# Patient Record
Sex: MALE | Race: WHITE | Employment: OTHER | URBAN - NONMETROPOLITAN AREA
[De-identification: names, ages, dates, MRNs, and addresses within clinical notes are randomized per-mention and may not be internally consistent; named-entity substitution may affect disease eponyms.]

---

## 2017-09-18 LAB — PROSTATE SPECIFIC ANTIGEN: NORMAL NG/ML

## 2017-09-28 ENCOUNTER — OFFICE VISIT (OUTPATIENT)
Dept: UROLOGY | Age: 67
End: 2017-09-28
Payer: MEDICARE

## 2017-09-28 VITALS
BODY MASS INDEX: 29.41 KG/M2 | SYSTOLIC BLOOD PRESSURE: 138 MMHG | WEIGHT: 183 LBS | DIASTOLIC BLOOD PRESSURE: 80 MMHG | HEIGHT: 66 IN

## 2017-09-28 DIAGNOSIS — N52.32 ERECTILE DYSFUNCTION FOLLOWING RADICAL CYSTECTOMY: Primary | ICD-10-CM

## 2017-09-28 DIAGNOSIS — C61 PROSTATE CANCER (HCC): ICD-10-CM

## 2017-09-28 LAB
BILIRUBIN, POC: NORMAL
BLOOD URINE, POC: NORMAL
CLARITY, POC: NORMAL
COLOR, POC: NORMAL
GLUCOSE URINE, POC: NORMAL
KETONES, POC: NORMAL
LEUKOCYTE EST, POC: NORMAL
NITRITE, POC: NORMAL
PH, POC: 6
PROTEIN, POC: NORMAL
SPECIFIC GRAVITY, POC: 1.01
UROBILINOGEN, POC: 0.2

## 2017-09-28 PROCEDURE — 81003 URINALYSIS AUTO W/O SCOPE: CPT | Performed by: NURSE PRACTITIONER

## 2017-09-28 PROCEDURE — 99213 OFFICE O/P EST LOW 20 MIN: CPT | Performed by: NURSE PRACTITIONER

## 2017-09-28 ASSESSMENT — ENCOUNTER SYMPTOMS
ABDOMINAL DISTENTION: 0
NAUSEA: 0
ABDOMINAL PAIN: 0
VOMITING: 0

## 2018-09-04 LAB — PROSTATE SPECIFIC ANTIGEN: NORMAL NG/ML

## 2018-09-27 ENCOUNTER — OFFICE VISIT (OUTPATIENT)
Dept: UROLOGY | Age: 68
End: 2018-09-27
Payer: MEDICARE

## 2018-09-27 VITALS
DIASTOLIC BLOOD PRESSURE: 78 MMHG | BODY MASS INDEX: 28.93 KG/M2 | SYSTOLIC BLOOD PRESSURE: 136 MMHG | HEIGHT: 66 IN | WEIGHT: 180 LBS

## 2018-09-27 DIAGNOSIS — C61 PROSTATE CA (HCC): Primary | ICD-10-CM

## 2018-09-27 LAB
BILIRUBIN, POC: NORMAL
BLOOD URINE, POC: NORMAL
CLARITY, POC: CLEAR
COLOR, POC: YELLOW
GLUCOSE URINE, POC: NORMAL
KETONES, POC: NORMAL
LEUKOCYTE EST, POC: NORMAL
NITRITE, POC: NORMAL
PH, POC: 6
PROTEIN, POC: NORMAL
SPECIFIC GRAVITY, POC: 1.02
UROBILINOGEN, POC: 0.2

## 2018-09-27 PROCEDURE — 99213 OFFICE O/P EST LOW 20 MIN: CPT | Performed by: NURSE PRACTITIONER

## 2018-09-27 PROCEDURE — 81003 URINALYSIS AUTO W/O SCOPE: CPT | Performed by: NURSE PRACTITIONER

## 2018-09-27 ASSESSMENT — ENCOUNTER SYMPTOMS
NAUSEA: 0
ABDOMINAL DISTENTION: 0
VOMITING: 0
ABDOMINAL PAIN: 0

## 2018-09-27 NOTE — PROGRESS NOTES
Urobilinogen, UA 0.2     Leukocytes, UA neg     Nitrite, UA neg          Assessment:      Prostate Cancer pT2C N0 M0      Plan:      PSA remains undetectable showing great cancer control. He does not want to try anything further for ED. Follow-up in 1 year with psa prior.

## 2019-09-16 LAB — PROSTATE SPECIFIC ANTIGEN: NORMAL NG/ML

## 2019-10-09 ENCOUNTER — OFFICE VISIT (OUTPATIENT)
Dept: UROLOGY | Age: 69
End: 2019-10-09
Payer: MEDICARE

## 2019-10-09 VITALS
DIASTOLIC BLOOD PRESSURE: 70 MMHG | SYSTOLIC BLOOD PRESSURE: 136 MMHG | BODY MASS INDEX: 28.45 KG/M2 | HEIGHT: 66 IN | WEIGHT: 177 LBS

## 2019-10-09 DIAGNOSIS — C61 PROSTATE CANCER (HCC): Primary | ICD-10-CM

## 2019-10-09 LAB
BILIRUBIN, POC: NORMAL
BLOOD URINE, POC: NORMAL
CLARITY, POC: CLEAR
COLOR, POC: YELLOW
GLUCOSE URINE, POC: NORMAL
KETONES, POC: NORMAL
LEUKOCYTE EST, POC: NORMAL
NITRITE, POC: NORMAL
PH, POC: 6.5
PROTEIN, POC: NORMAL
SPECIFIC GRAVITY, POC: <=1.005
UROBILINOGEN, POC: 0.2

## 2019-10-09 PROCEDURE — 99213 OFFICE O/P EST LOW 20 MIN: CPT | Performed by: NURSE PRACTITIONER

## 2019-10-09 PROCEDURE — 81003 URINALYSIS AUTO W/O SCOPE: CPT | Performed by: NURSE PRACTITIONER

## 2019-10-09 ASSESSMENT — ENCOUNTER SYMPTOMS
ABDOMINAL PAIN: 0
BACK PAIN: 0

## 2020-09-21 LAB — PROSTATE SPECIFIC ANTIGEN: NORMAL

## 2020-10-07 ENCOUNTER — OFFICE VISIT (OUTPATIENT)
Dept: UROLOGY | Age: 70
End: 2020-10-07
Payer: MEDICARE

## 2020-10-07 VITALS — TEMPERATURE: 97.8 F | HEIGHT: 66 IN | BODY MASS INDEX: 27.58 KG/M2 | WEIGHT: 171.6 LBS

## 2020-10-07 LAB
BILIRUBIN, POC: NORMAL
BLOOD URINE, POC: NEGATIVE
CLARITY, POC: CLEAR
COLOR, POC: YELLOW
GLUCOSE URINE, POC: NEGATIVE
KETONES, POC: NORMAL
LEUKOCYTE EST, POC: NEGATIVE
NITRITE, POC: NEGATIVE
PH, POC: 7
PROTEIN, POC: NORMAL
SPECIFIC GRAVITY, POC: 1.01
UROBILINOGEN, POC: 0.2

## 2020-10-07 PROCEDURE — 99213 OFFICE O/P EST LOW 20 MIN: CPT | Performed by: NURSE PRACTITIONER

## 2020-10-07 PROCEDURE — 81003 URINALYSIS AUTO W/O SCOPE: CPT | Performed by: NURSE PRACTITIONER

## 2020-10-07 RX ORDER — QUINAPRIL HCL AND HYDROCHLOROTHIAZIDE 20; 12.5 MG/1; MG/1
TABLET ORAL
COMMUNITY
Start: 2020-09-10 | End: 2022-10-19

## 2020-10-07 ASSESSMENT — ENCOUNTER SYMPTOMS
BACK PAIN: 0
ABDOMINAL PAIN: 0

## 2020-10-07 NOTE — PROGRESS NOTES
[address] [facility]  [address] [facility] [address]  Dept: [phone]  Loc: [phone]    Visit Date: 10/7/2020        HPI:     Verito Hyatt is a 65 y.o. male who presents today for:  Chief Complaint   Patient presents with    Prostate Cancer    1 Year Follow Up     PSA prior       HPI   Pt seen in follow up for prostate cancer. Pt underwent RALRP with BLND 8/27/12 by Dr Genny Higgins with [place] 3+4=7 [id number] prostate cancer. Pt doing well. Denies any issues with incontinence or urination. No hematuria, dysuria. No abdominal/back/flank pain. Denies erectile dysfunction. Current Outpatient Medications   Medication Sig Dispense Refill    quinapril-hydroCHLOROthiazide (ACCURETIC) 20-12.5 MG per tablet       aspirin 325 MG EC tablet Take 325 mg by mouth daily.  omeprazole (PRILOSEC) 20 MG capsule Take 20 mg by mouth daily. No current facility-administered medications for this visit. Past Medical History  Yuan Downey  has a past medical history of Hypertension and Prostate CA ([facility] [address] Past Surgical History  The patient  has a past surgical history that includes Tonsillectomy and adenoidectomy (1760); hernia repair (1982); Prostatectomy (8-3-12); colectomy (9-1-2012); Vasectomy (); and Colonoscopy (9-9-2013). Family History  This patient's family history includes Asthma in his maternal grandmother; Cancer in his mother; Heart Disease in his father, maternal grandfather, maternal grandmother, and mother; High Blood Pressure in his father and mother; High Cholesterol in his father; [place] / [place] in his mother; Stroke in his mother. Social History  Yuan Downey  reports that he quit smoking about 30 years ago. His smoking use included cigarettes. He has a 0.50 pack-year smoking history. He has never used smokeless tobacco. He reports current alcohol use of about 3.0 standard drinks of alcohol per week. He reports that he does not use drugs. Subjective:      Review of Systems   Constitutional: Negative for activity change, appetite change, chills, diaphoresis, fatigue, fever and unexpected weight change. Gastrointestinal: Negative for abdominal pain. Genitourinary: Negative for decreased urine volume, difficulty urinating, dysuria, flank pain, frequency, hematuria and urgency. Musculoskeletal: Negative for back pain. Objective:   Temp 97.8 °F (36.6 °C) (Temporal)   Ht 5' 6\" (1.676 m)   Wt 171 lb 9.6 oz (77.8 kg)   BMI 27.70 kg/m²     Physical Exam  Constitutional:       General: He is not in acute distress. Appearance: Normal appearance. He is not ill-appearing or diaphoretic. HENT:      Head: Normocephalic and atraumatic. Right Ear: External ear normal.      Left Ear: External ear normal.      Nose: Nose normal.      Mouth/Throat:      Mouth: Mucous membranes are moist.   Eyes:      General: No scleral icterus. Right eye: No discharge. Left eye: No discharge. Pulmonary:      Effort: Pulmonary effort is normal. No respiratory distress. Abdominal:      Tenderness: There is no right CVA tenderness or left CVA tenderness. Musculoskeletal: Normal range of motion. Skin:     General: Skin is warm and dry. Neurological:      Mental Status: He is alert and oriented to person, place, and time. Mental status is at baseline. Psychiatric:         Mood and Affect: Mood normal.         Behavior: Behavior normal.         Thought Content:  Thought content normal.         POC  Results for POC orders placed in visit on 10/07/20   POCT Urinalysis No Micro (Auto)   Result Value Ref Range    Color, UA Yellow     Clarity, UA Clear     Glucose, UA POC Negative     Bilirubin, UA Small     Ketones, UA Trace     Spec Grav, UA 1.015     Blood, UA POC Negative     pH, UA 7.0     Protein, UA POC Trace     Urobilinogen, UA 0.2     Leukocytes, UA Negative     Nitrite, UA Negative Patients recent PSA values are as follows  Lab Results   Component Value Date    PSA  09/21/2020      Comment:      <0.03    PSA  09/16/2019      Comment:      <0.03    PSA  09/04/2018      Comment:      <0.03        Recent BUN/Creatinine:  Lab Results   Component Value Date    BUN 7 08/04/2012    CREATININE 0.7 08/04/2012           Assessment:   Prostate cancer    Plan:     Pt's PSA continues at <.03 showing excellent prostate cancer control at this time. No issues with urination or erectile dysfunction. F/u in 1 year with PSA prior to appt.

## 2020-10-12 ENCOUNTER — NURSE ONLY (OUTPATIENT)
Dept: LAB | Age: 70
End: 2020-10-12

## 2021-09-23 LAB — PROSTATE SPECIFIC ANTIGEN: NORMAL

## 2021-10-14 ENCOUNTER — OFFICE VISIT (OUTPATIENT)
Dept: UROLOGY | Age: 71
End: 2021-10-14
Payer: MEDICARE

## 2021-10-14 VITALS
WEIGHT: 163 LBS | HEIGHT: 66 IN | BODY MASS INDEX: 26.2 KG/M2 | DIASTOLIC BLOOD PRESSURE: 78 MMHG | SYSTOLIC BLOOD PRESSURE: 128 MMHG

## 2021-10-14 DIAGNOSIS — C61 PROSTATE CANCER (HCC): Primary | ICD-10-CM

## 2021-10-14 PROCEDURE — 99213 OFFICE O/P EST LOW 20 MIN: CPT | Performed by: NURSE PRACTITIONER

## 2021-10-14 ASSESSMENT — ENCOUNTER SYMPTOMS
BACK PAIN: 0
NAUSEA: 0
ABDOMINAL PAIN: 0
VOMITING: 0

## 2021-10-14 NOTE — PROGRESS NOTES
[address] [address]  Dept: [phone]  Loc: [phone]    Visit Date: 10/14/2021        HPI:     Emma Peralta is a 66 y.o. male who presents today for:  Chief Complaint   Patient presents with    Follow-up     psa prior    Prostate Cancer       HPI   Pt seen in follow up for prostate cancer. Pt underwent RALRP with BLND 8/27/12 by Dr Maryjo Redd with [place] 3+4=7 [id number] prostate cancer. Reports occasional leaking in winter with activity but no significant incontinence. Doing well overall. Remains active. Current Outpatient Medications   Medication Sig Dispense Refill    quinapril-hydroCHLOROthiazide (ACCURETIC) 20-12.5 MG per tablet       aspirin 325 MG EC tablet Take 325 mg by mouth daily.  omeprazole (PRILOSEC) 20 MG capsule Take 20 mg by mouth daily. No current facility-administered medications for this visit. Past Medical History  Kostas Campbell  has a past medical history of Hypertension and Prostate CA ([facility] [address] Past Surgical History  The patient  has a past surgical history that includes Tonsillectomy and adenoidectomy (0234); hernia repair (1982); Prostatectomy (8-3-12); colectomy (9-1-2012); Vasectomy (); and Colonoscopy (9-9-2013). Family History  This patient's family history includes Asthma in his maternal grandmother; Cancer in his mother; Heart Disease in his father, maternal grandfather, maternal grandmother, and mother; High Blood Pressure in his father and mother; High Cholesterol in his father; [place] / [place] in his mother; Stroke in his mother. Social History  Kostas Campbell  reports that he quit smoking about 31 years ago. His smoking use included cigarettes. He has a 0.50 pack-year smoking history. He has never used smokeless tobacco. He reports current alcohol use of about 3.0 standard drinks of alcohol per week.  He reports that he does not use drugs.      Subjective:      Review of Systems   Constitutional: Negative for activity change, appetite change, chills, diaphoresis, fatigue, fever and unexpected weight change. Gastrointestinal: Negative for abdominal pain, nausea and vomiting. Genitourinary: Negative for decreased urine volume, difficulty urinating, dysuria, flank pain, frequency, hematuria and urgency. Musculoskeletal: Negative for back pain. Objective:   /78   Ht 5' 6\" (1.676 m)   Wt 163 lb (73.9 kg)   BMI 26.31 kg/m²     Physical Exam  Vitals reviewed. Constitutional:       General: He is not in acute distress. Appearance: Normal appearance. He is well-developed. He is not ill-appearing or diaphoretic. HENT:      Head: Normocephalic and atraumatic. Right Ear: External ear normal.      Left Ear: External ear normal.      Nose: Nose normal.      Mouth/Throat:      Mouth: Mucous membranes are moist.   Eyes:      General: No scleral icterus. Right eye: No discharge. Left eye: No discharge. Neck:      Vascular: No JVD. Trachea: No tracheal deviation. Cardiovascular:      Rate and Rhythm: Normal rate and regular rhythm. Pulmonary:      Effort: Pulmonary effort is normal. No respiratory distress. Breath sounds: Normal breath sounds. Abdominal:      General: There is no distension. Tenderness: There is no abdominal tenderness. There is no right CVA tenderness or left CVA tenderness. Musculoskeletal:         General: Normal range of motion. Skin:     General: Skin is warm and dry. Neurological:      Mental Status: He is alert and oriented to person, place, and time. Mental status is at baseline. Psychiatric:         Mood and Affect: Mood normal.         Behavior: Behavior normal.         Thought Content: Thought content normal.       POC  No results found for this visit on 10/14/21.     Patients recent PSA values are as follows  Lab Results   Component Value Date    PSA 09/23/2021      Comment:      <0.03    PSA  09/21/2020      Comment:      <0.03    PSA  09/16/2019      Comment:      <0.03     Recent BUN/Creatinine:  Lab Results   Component Value Date    BUN 7 08/04/2012    CREATININE 0.7 08/04/2012       Assessment:   Prostate cancer    Plan:     PSA undetectable at <0.03 showing excellent prostate cancer control at this time. F/u in 1 year with PSA a few days prior.

## 2022-09-26 LAB
BUN BLDV-MCNC: 6 MG/DL
CALCIUM SERPL-MCNC: 10 MG/DL
CHLORIDE BLD-SCNC: 94 MMOL/L
CHOLESTEROL, TOTAL: 199 MG/DL
CHOLESTEROL/HDL RATIO: ABNORMAL
CO2: 31 MMOL/L
CREAT SERPL-MCNC: 0.8 MG/DL
GFR CALCULATED: NORMAL
GLUCOSE BLD-MCNC: 106 MG/DL
HDLC SERPL-MCNC: 76 MG/DL (ref 35–70)
LDL CHOLESTEROL CALCULATED: 115 MG/DL (ref 0–160)
NONHDLC SERPL-MCNC: ABNORMAL MG/DL
POTASSIUM SERPL-SCNC: 4 MMOL/L
PROSTATE SPECIFIC ANTIGEN: NORMAL
SODIUM BLD-SCNC: 133 MMOL/L
TRIGL SERPL-MCNC: 42 MG/DL
VLDLC SERPL CALC-MCNC: 8 MG/DL

## 2022-10-19 ENCOUNTER — OFFICE VISIT (OUTPATIENT)
Dept: UROLOGY | Age: 72
End: 2022-10-19
Payer: MEDICARE

## 2022-10-19 VITALS
WEIGHT: 158.6 LBS | SYSTOLIC BLOOD PRESSURE: 120 MMHG | DIASTOLIC BLOOD PRESSURE: 64 MMHG | BODY MASS INDEX: 25.49 KG/M2 | HEIGHT: 66 IN

## 2022-10-19 DIAGNOSIS — C61 PROSTATE CANCER (HCC): Primary | ICD-10-CM

## 2022-10-19 PROCEDURE — 1123F ACP DISCUSS/DSCN MKR DOCD: CPT | Performed by: NURSE PRACTITIONER

## 2022-10-19 PROCEDURE — 99213 OFFICE O/P EST LOW 20 MIN: CPT | Performed by: NURSE PRACTITIONER

## 2022-10-19 RX ORDER — LISINOPRIL AND HYDROCHLOROTHIAZIDE 25; 20 MG/1; MG/1
1 TABLET ORAL DAILY
COMMUNITY
Start: 2022-09-06

## 2022-10-19 ASSESSMENT — ENCOUNTER SYMPTOMS
VOMITING: 0
BACK PAIN: 0
ABDOMINAL PAIN: 0
NAUSEA: 0

## 2022-10-19 NOTE — PROGRESS NOTES
[address] [address]  Dept: [phone]  Loc: [phone]    Visit Date: 10/19/2022        HPI:     Sandeep Stinson is a 74 y.o. male who presents today for:  Chief Complaint   Patient presents with    Prostate Cancer     PSA prior       HPI    Pt seen in follow up for prostate cancer. Pt underwent RALRP with BLND 8/27/12 by Dr Na Ferrell with [place] 3+4=7 [id number] prostate cancer. Reports only rare occasional stress urinary incontinence. Doing well. No recent changes to health. Current Outpatient Medications   Medication Sig Dispense Refill    lisinopril-hydroCHLOROthiazide (PRINZIDE;ZESTORETIC) 20-25 MG per tablet Take 1 tablet by mouth daily      aspirin 325 MG EC tablet Take 325 mg by mouth daily. omeprazole (PRILOSEC) 20 MG capsule Take 20 mg by mouth daily. No current facility-administered medications for this visit. Past Medical History  Calr Villatoro  has a past medical history of Hypertension and Prostate CA ([facility] [address] Past Surgical History  The patient  has a past surgical history that includes Tonsillectomy and adenoidectomy (6664); hernia repair (1982); Prostatectomy (8-3-12); colectomy (9-1-2012); Vasectomy (); and Colonoscopy (9-9-2013). Family History  This patient's family history includes Asthma in his maternal grandmother; Cancer in his mother; Heart Disease in his father, maternal grandfather, maternal grandmother, and mother; High Blood Pressure in his father and mother; High Cholesterol in his father; [place] / [place] in his mother; Stroke in his mother. Social History  Carl Villatoro  reports that he quit smoking about 32 years ago. His smoking use included cigarettes. He has a 0.50 pack-year smoking history. He has never used smokeless tobacco. He reports current alcohol use of about 3.0 standard drinks per week.  He reports that he does not use drugs.      Subjective:      Review of Systems   Constitutional:  Negative for activity change, appetite change, chills, diaphoresis, fatigue, fever and unexpected weight change. Gastrointestinal:  Negative for abdominal pain, nausea and vomiting. Genitourinary:  Negative for decreased urine volume, difficulty urinating, dysuria, flank pain, frequency, hematuria and urgency. Musculoskeletal:  Negative for back pain. Objective:   /64   Ht 5' 6\" (1.676 m)   Wt 158 lb 9.6 oz (71.9 kg)   BMI 25.60 kg/m²     Physical Exam  Vitals reviewed. Constitutional:       General: He is not in acute distress. Appearance: Normal appearance. He is well-developed. He is not ill-appearing or diaphoretic. HENT:      Head: Normocephalic and atraumatic. Right Ear: External ear normal.      Left Ear: External ear normal.      Nose: Nose normal.      Mouth/Throat:      Mouth: Mucous membranes are moist.   Eyes:      General: No scleral icterus. Right eye: No discharge. Left eye: No discharge. Neck:      Vascular: No JVD. Trachea: No tracheal deviation. Cardiovascular:      Rate and Rhythm: Normal rate and regular rhythm. Pulmonary:      Effort: Pulmonary effort is normal. No respiratory distress. Abdominal:      General: There is no distension. Tenderness: There is no abdominal tenderness. There is no right CVA tenderness or left CVA tenderness. Musculoskeletal:         General: Normal range of motion. Skin:     General: Skin is warm and dry. Neurological:      Mental Status: He is alert and oriented to person, place, and time. Mental status is at baseline. Psychiatric:         Mood and Affect: Mood normal.         Behavior: Behavior normal.         Thought Content: Thought content normal.       POC  No results found for this visit on 10/19/22.       Patients recent PSA values are as follows  Lab Results   Component Value Date    PSA  09/26/2022      Comment:      <0.03 PSA  09/23/2021      Comment:      <0.03    PSA  09/21/2020      Comment:      <0.03        Recent BUN/Creatinine:  Lab Results   Component Value Date/Time    BUN 6 09/26/2022 12:00 AM    CREATININE 0.8 09/26/2022 12:00 AM         Assessment:   Prostate cancer    Plan:     PSA undetectable at <0.03 showing excellent prostate cancer control at this time. Pt has completed 10 years of surveillance without evidence of recurrence. He is very pleased. F/u with urology PRN.